# Patient Record
Sex: MALE | Race: WHITE | NOT HISPANIC OR LATINO | Employment: OTHER | URBAN - METROPOLITAN AREA
[De-identification: names, ages, dates, MRNs, and addresses within clinical notes are randomized per-mention and may not be internally consistent; named-entity substitution may affect disease eponyms.]

---

## 2023-05-26 ENCOUNTER — APPOINTMENT (OUTPATIENT)
Dept: RADIOLOGY | Facility: MEDICAL CENTER | Age: 83
DRG: 983 | End: 2023-05-26
Attending: EMERGENCY MEDICINE
Payer: MEDICARE

## 2023-05-26 ENCOUNTER — APPOINTMENT (OUTPATIENT)
Dept: RADIOLOGY | Facility: MEDICAL CENTER | Age: 83
DRG: 983 | End: 2023-05-26
Payer: MEDICARE

## 2023-05-26 ENCOUNTER — ANESTHESIA EVENT (OUTPATIENT)
Dept: SURGERY | Facility: MEDICAL CENTER | Age: 83
DRG: 983 | End: 2023-05-26
Payer: MEDICARE

## 2023-05-26 ENCOUNTER — HOSPITAL ENCOUNTER (INPATIENT)
Facility: MEDICAL CENTER | Age: 83
LOS: 1 days | DRG: 983 | End: 2023-05-27
Attending: EMERGENCY MEDICINE | Admitting: SURGERY
Payer: MEDICARE

## 2023-05-26 ENCOUNTER — ANESTHESIA (OUTPATIENT)
Dept: SURGERY | Facility: MEDICAL CENTER | Age: 83
DRG: 983 | End: 2023-05-26
Payer: MEDICARE

## 2023-05-26 DIAGNOSIS — S01.01XA LACERATION OF SCALP, INITIAL ENCOUNTER: ICD-10-CM

## 2023-05-26 DIAGNOSIS — T14.90XA TRAUMA: ICD-10-CM

## 2023-05-26 LAB
ABO + RH BLD: NORMAL
ABO GROUP BLD: NORMAL
ALBUMIN SERPL BCP-MCNC: 4 G/DL (ref 3.2–4.9)
ALBUMIN/GLOB SERPL: 1.5 G/DL
ALP SERPL-CCNC: 64 U/L (ref 30–99)
ALT SERPL-CCNC: 28 U/L (ref 2–50)
ANION GAP SERPL CALC-SCNC: 19 MMOL/L (ref 7–16)
APTT PPP: 25.5 SEC (ref 24.7–36)
AST SERPL-CCNC: 48 U/L (ref 12–45)
BILIRUB SERPL-MCNC: 0.5 MG/DL (ref 0.1–1.5)
BLD GP AB SCN SERPL QL: NORMAL
BUN SERPL-MCNC: 35 MG/DL (ref 8–22)
CALCIUM ALBUM COR SERPL-MCNC: 9.1 MG/DL (ref 8.5–10.5)
CALCIUM SERPL-MCNC: 9.1 MG/DL (ref 8.5–10.5)
CHLORIDE SERPL-SCNC: 101 MMOL/L (ref 96–112)
CO2 SERPL-SCNC: 20 MMOL/L (ref 20–33)
CREAT SERPL-MCNC: 1.18 MG/DL (ref 0.5–1.4)
EKG IMPRESSION: NORMAL
ERYTHROCYTE [DISTWIDTH] IN BLOOD BY AUTOMATED COUNT: 42.5 FL (ref 35.9–50)
ETHANOL BLD-MCNC: <10.1 MG/DL
GFR SERPLBLD CREATININE-BSD FMLA CKD-EPI: 61 ML/MIN/1.73 M 2
GLOBULIN SER CALC-MCNC: 2.6 G/DL (ref 1.9–3.5)
GLUCOSE SERPL-MCNC: 104 MG/DL (ref 65–99)
HCT VFR BLD AUTO: 40.8 % (ref 42–52)
HGB BLD-MCNC: 13.9 G/DL (ref 14–18)
INR PPP: 1.06 (ref 0.87–1.13)
MCH RBC QN AUTO: 31.2 PG (ref 27–33)
MCHC RBC AUTO-ENTMCNC: 34.1 G/DL (ref 32.3–36.5)
MCV RBC AUTO: 91.5 FL (ref 81.4–97.8)
PLATELET # BLD AUTO: 270 K/UL (ref 164–446)
PMV BLD AUTO: 9.5 FL (ref 9–12.9)
POTASSIUM SERPL-SCNC: 3.5 MMOL/L (ref 3.6–5.5)
PROT SERPL-MCNC: 6.6 G/DL (ref 6–8.2)
PROTHROMBIN TIME: 13.7 SEC (ref 12–14.6)
RBC # BLD AUTO: 4.46 M/UL (ref 4.7–6.1)
RH BLD: NORMAL
SODIUM SERPL-SCNC: 140 MMOL/L (ref 135–145)
WBC # BLD AUTO: 15.1 K/UL (ref 4.8–10.8)

## 2023-05-26 PROCEDURE — 700111 HCHG RX REV CODE 636 W/ 250 OVERRIDE (IP): Performed by: ANESTHESIOLOGY

## 2023-05-26 PROCEDURE — 160009 HCHG ANES TIME/MIN: Performed by: SURGERY

## 2023-05-26 PROCEDURE — 3E0234Z INTRODUCTION OF SERUM, TOXOID AND VACCINE INTO MUSCLE, PERCUTANEOUS APPROACH: ICD-10-PCS | Performed by: EMERGENCY MEDICINE

## 2023-05-26 PROCEDURE — A9270 NON-COVERED ITEM OR SERVICE: HCPCS | Performed by: SURGERY

## 2023-05-26 PROCEDURE — 99291 CRITICAL CARE FIRST HOUR: CPT | Mod: AI,25 | Performed by: SURGERY

## 2023-05-26 PROCEDURE — 93005 ELECTROCARDIOGRAM TRACING: CPT | Performed by: SURGERY

## 2023-05-26 PROCEDURE — 86900 BLOOD TYPING SEROLOGIC ABO: CPT

## 2023-05-26 PROCEDURE — 700101 HCHG RX REV CODE 250: Performed by: ANESTHESIOLOGY

## 2023-05-26 PROCEDURE — 90715 TDAP VACCINE 7 YRS/> IM: CPT | Performed by: SURGERY

## 2023-05-26 PROCEDURE — 700102 HCHG RX REV CODE 250 W/ 637 OVERRIDE(OP): Performed by: SURGERY

## 2023-05-26 PROCEDURE — 85027 COMPLETE CBC AUTOMATED: CPT

## 2023-05-26 PROCEDURE — 99100 ANES PT EXTEME AGE<1 YR&>70: CPT | Performed by: ANESTHESIOLOGY

## 2023-05-26 PROCEDURE — 71260 CT THORAX DX C+: CPT

## 2023-05-26 PROCEDURE — 72131 CT LUMBAR SPINE W/O DYE: CPT

## 2023-05-26 PROCEDURE — 160038 HCHG SURGERY MINUTES - EA ADDL 1 MIN LEVEL 2: Performed by: SURGERY

## 2023-05-26 PROCEDURE — 700105 HCHG RX REV CODE 258: Performed by: SURGERY

## 2023-05-26 PROCEDURE — 86901 BLOOD TYPING SEROLOGIC RH(D): CPT

## 2023-05-26 PROCEDURE — 82077 ASSAY SPEC XCP UR&BREATH IA: CPT

## 2023-05-26 PROCEDURE — 85610 PROTHROMBIN TIME: CPT

## 2023-05-26 PROCEDURE — 13122 CMPLX RPR S/A/L ADDL 5 CM/>: CPT | Performed by: SURGERY

## 2023-05-26 PROCEDURE — 90471 IMMUNIZATION ADMIN: CPT

## 2023-05-26 PROCEDURE — 86850 RBC ANTIBODY SCREEN: CPT

## 2023-05-26 PROCEDURE — 160035 HCHG PACU - 1ST 60 MINS PHASE I: Performed by: SURGERY

## 2023-05-26 PROCEDURE — 72170 X-RAY EXAM OF PELVIS: CPT

## 2023-05-26 PROCEDURE — 72125 CT NECK SPINE W/O DYE: CPT

## 2023-05-26 PROCEDURE — 700117 HCHG RX CONTRAST REV CODE 255: Performed by: EMERGENCY MEDICINE

## 2023-05-26 PROCEDURE — 160027 HCHG SURGERY MINUTES - 1ST 30 MINS LEVEL 2: Performed by: SURGERY

## 2023-05-26 PROCEDURE — 36415 COLL VENOUS BLD VENIPUNCTURE: CPT

## 2023-05-26 PROCEDURE — 80053 COMPREHEN METABOLIC PANEL: CPT

## 2023-05-26 PROCEDURE — 160036 HCHG PACU - EA ADDL 30 MINS PHASE I: Performed by: SURGERY

## 2023-05-26 PROCEDURE — 160002 HCHG RECOVERY MINUTES (STAT): Performed by: SURGERY

## 2023-05-26 PROCEDURE — 72128 CT CHEST SPINE W/O DYE: CPT

## 2023-05-26 PROCEDURE — 13121 CMPLX RPR S/A/L 2.6-7.5 CM: CPT | Performed by: SURGERY

## 2023-05-26 PROCEDURE — G0390 TRAUMA RESPONS W/HOSP CRITI: HCPCS

## 2023-05-26 PROCEDURE — 0NB10ZZ EXCISION OF FRONTAL BONE, OPEN APPROACH: ICD-10-PCS | Performed by: SURGERY

## 2023-05-26 PROCEDURE — 00300 ANES ALL PX INTEG H/N/PTRUNK: CPT | Performed by: ANESTHESIOLOGY

## 2023-05-26 PROCEDURE — 71045 X-RAY EXAM CHEST 1 VIEW: CPT

## 2023-05-26 PROCEDURE — 96365 THER/PROPH/DIAG IV INF INIT: CPT

## 2023-05-26 PROCEDURE — 700111 HCHG RX REV CODE 636 W/ 250 OVERRIDE (IP): Performed by: SURGERY

## 2023-05-26 PROCEDURE — 700105 HCHG RX REV CODE 258: Performed by: ANESTHESIOLOGY

## 2023-05-26 PROCEDURE — 99140 ANES COMP EMERGENCY COND: CPT | Performed by: ANESTHESIOLOGY

## 2023-05-26 PROCEDURE — 93010 ELECTROCARDIOGRAM REPORT: CPT | Performed by: INTERNAL MEDICINE

## 2023-05-26 PROCEDURE — 160048 HCHG OR STATISTICAL LEVEL 1-5: Performed by: SURGERY

## 2023-05-26 PROCEDURE — 70450 CT HEAD/BRAIN W/O DYE: CPT

## 2023-05-26 PROCEDURE — 770001 HCHG ROOM/CARE - MED/SURG/GYN PRIV*

## 2023-05-26 PROCEDURE — 85730 THROMBOPLASTIN TIME PARTIAL: CPT

## 2023-05-26 PROCEDURE — 99291 CRITICAL CARE FIRST HOUR: CPT

## 2023-05-26 RX ORDER — ONDANSETRON 2 MG/ML
4 INJECTION INTRAMUSCULAR; INTRAVENOUS
Status: DISCONTINUED | OUTPATIENT
Start: 2023-05-26 | End: 2023-05-26 | Stop reason: HOSPADM

## 2023-05-26 RX ORDER — OXYCODONE HCL 5 MG/5 ML
5 SOLUTION, ORAL ORAL
Status: DISCONTINUED | OUTPATIENT
Start: 2023-05-26 | End: 2023-05-26 | Stop reason: HOSPADM

## 2023-05-26 RX ORDER — SODIUM CHLORIDE, SODIUM LACTATE, POTASSIUM CHLORIDE, CALCIUM CHLORIDE 600; 310; 30; 20 MG/100ML; MG/100ML; MG/100ML; MG/100ML
INJECTION, SOLUTION INTRAVENOUS
Status: DISCONTINUED | OUTPATIENT
Start: 2023-05-26 | End: 2023-05-26 | Stop reason: SURG

## 2023-05-26 RX ORDER — HALOPERIDOL 5 MG/ML
1 INJECTION INTRAMUSCULAR
Status: DISCONTINUED | OUTPATIENT
Start: 2023-05-26 | End: 2023-05-26 | Stop reason: HOSPADM

## 2023-05-26 RX ORDER — ONDANSETRON 2 MG/ML
4 INJECTION INTRAMUSCULAR; INTRAVENOUS EVERY 4 HOURS PRN
Status: DISCONTINUED | OUTPATIENT
Start: 2023-05-26 | End: 2023-05-27 | Stop reason: HOSPADM

## 2023-05-26 RX ORDER — ENEMA 19; 7 G/133ML; G/133ML
1 ENEMA RECTAL
Status: DISCONTINUED | OUTPATIENT
Start: 2023-05-26 | End: 2023-05-27 | Stop reason: HOSPADM

## 2023-05-26 RX ORDER — BISACODYL 10 MG
10 SUPPOSITORY, RECTAL RECTAL
Status: DISCONTINUED | OUTPATIENT
Start: 2023-05-26 | End: 2023-05-27 | Stop reason: HOSPADM

## 2023-05-26 RX ORDER — HYDROMORPHONE HYDROCHLORIDE 1 MG/ML
0.4 INJECTION, SOLUTION INTRAMUSCULAR; INTRAVENOUS; SUBCUTANEOUS
Status: DISCONTINUED | OUTPATIENT
Start: 2023-05-26 | End: 2023-05-26 | Stop reason: HOSPADM

## 2023-05-26 RX ORDER — ONDANSETRON 4 MG/1
4 TABLET, ORALLY DISINTEGRATING ORAL EVERY 4 HOURS PRN
Status: DISCONTINUED | OUTPATIENT
Start: 2023-05-26 | End: 2023-05-27 | Stop reason: HOSPADM

## 2023-05-26 RX ORDER — BACITRACIN ZINC 500 [USP'U]/G
OINTMENT TOPICAL
Status: DISCONTINUED | OUTPATIENT
Start: 2023-05-26 | End: 2023-05-26 | Stop reason: HOSPADM

## 2023-05-26 RX ORDER — HYDRALAZINE HYDROCHLORIDE 20 MG/ML
5 INJECTION INTRAMUSCULAR; INTRAVENOUS
Status: DISCONTINUED | OUTPATIENT
Start: 2023-05-26 | End: 2023-05-26 | Stop reason: HOSPADM

## 2023-05-26 RX ORDER — DOCUSATE SODIUM 100 MG/1
100 CAPSULE, LIQUID FILLED ORAL 2 TIMES DAILY
Status: DISCONTINUED | OUTPATIENT
Start: 2023-05-26 | End: 2023-05-27 | Stop reason: HOSPADM

## 2023-05-26 RX ORDER — ACETAMINOPHEN 325 MG/1
650 TABLET ORAL EVERY 6 HOURS
Status: DISCONTINUED | OUTPATIENT
Start: 2023-05-26 | End: 2023-05-27 | Stop reason: HOSPADM

## 2023-05-26 RX ORDER — HYDROMORPHONE HYDROCHLORIDE 1 MG/ML
0.2 INJECTION, SOLUTION INTRAMUSCULAR; INTRAVENOUS; SUBCUTANEOUS
Status: DISCONTINUED | OUTPATIENT
Start: 2023-05-26 | End: 2023-05-26 | Stop reason: HOSPADM

## 2023-05-26 RX ORDER — ROCURONIUM BROMIDE 10 MG/ML
INJECTION, SOLUTION INTRAVENOUS PRN
Status: DISCONTINUED | OUTPATIENT
Start: 2023-05-26 | End: 2023-05-26 | Stop reason: SURG

## 2023-05-26 RX ORDER — OXYCODONE HCL 5 MG/5 ML
10 SOLUTION, ORAL ORAL
Status: DISCONTINUED | OUTPATIENT
Start: 2023-05-26 | End: 2023-05-26 | Stop reason: HOSPADM

## 2023-05-26 RX ORDER — SODIUM CHLORIDE, SODIUM LACTATE, POTASSIUM CHLORIDE, CALCIUM CHLORIDE 600; 310; 30; 20 MG/100ML; MG/100ML; MG/100ML; MG/100ML
INJECTION, SOLUTION INTRAVENOUS ONCE
Status: COMPLETED | OUTPATIENT
Start: 2023-05-26 | End: 2023-05-26

## 2023-05-26 RX ORDER — SUCCINYLCHOLINE CHLORIDE 20 MG/ML
INJECTION INTRAMUSCULAR; INTRAVENOUS PRN
Status: DISCONTINUED | OUTPATIENT
Start: 2023-05-26 | End: 2023-05-26 | Stop reason: SURG

## 2023-05-26 RX ORDER — CEFAZOLIN SODIUM 1 G/3ML
INJECTION, POWDER, FOR SOLUTION INTRAMUSCULAR; INTRAVENOUS PRN
Status: DISCONTINUED | OUTPATIENT
Start: 2023-05-26 | End: 2023-05-26 | Stop reason: SURG

## 2023-05-26 RX ORDER — SODIUM CHLORIDE, SODIUM LACTATE, POTASSIUM CHLORIDE, CALCIUM CHLORIDE 600; 310; 30; 20 MG/100ML; MG/100ML; MG/100ML; MG/100ML
INJECTION, SOLUTION INTRAVENOUS CONTINUOUS
Status: DISCONTINUED | OUTPATIENT
Start: 2023-05-26 | End: 2023-05-26 | Stop reason: HOSPADM

## 2023-05-26 RX ORDER — DIPHENHYDRAMINE HYDROCHLORIDE 50 MG/ML
12.5 INJECTION INTRAMUSCULAR; INTRAVENOUS
Status: DISCONTINUED | OUTPATIENT
Start: 2023-05-26 | End: 2023-05-26 | Stop reason: HOSPADM

## 2023-05-26 RX ORDER — AMOXICILLIN 250 MG
1 CAPSULE ORAL NIGHTLY
Status: DISCONTINUED | OUTPATIENT
Start: 2023-05-26 | End: 2023-05-27 | Stop reason: HOSPADM

## 2023-05-26 RX ORDER — LIDOCAINE HYDROCHLORIDE 20 MG/ML
INJECTION, SOLUTION EPIDURAL; INFILTRATION; INTRACAUDAL; PERINEURAL PRN
Status: DISCONTINUED | OUTPATIENT
Start: 2023-05-26 | End: 2023-05-26 | Stop reason: SURG

## 2023-05-26 RX ORDER — POLYETHYLENE GLYCOL 3350 17 G/17G
1 POWDER, FOR SOLUTION ORAL 2 TIMES DAILY
Status: DISCONTINUED | OUTPATIENT
Start: 2023-05-26 | End: 2023-05-27 | Stop reason: HOSPADM

## 2023-05-26 RX ORDER — OXYCODONE HYDROCHLORIDE 5 MG/1
5 TABLET ORAL
Status: DISCONTINUED | OUTPATIENT
Start: 2023-05-26 | End: 2023-05-27 | Stop reason: HOSPADM

## 2023-05-26 RX ORDER — HYDROMORPHONE HYDROCHLORIDE 1 MG/ML
0.1 INJECTION, SOLUTION INTRAMUSCULAR; INTRAVENOUS; SUBCUTANEOUS
Status: DISCONTINUED | OUTPATIENT
Start: 2023-05-26 | End: 2023-05-26 | Stop reason: HOSPADM

## 2023-05-26 RX ORDER — OXYCODONE HYDROCHLORIDE 5 MG/1
2.5 TABLET ORAL
Status: DISCONTINUED | OUTPATIENT
Start: 2023-05-26 | End: 2023-05-27 | Stop reason: HOSPADM

## 2023-05-26 RX ORDER — ACETAMINOPHEN 325 MG/1
650 TABLET ORAL EVERY 6 HOURS PRN
Status: DISCONTINUED | OUTPATIENT
Start: 2023-05-31 | End: 2023-05-27 | Stop reason: HOSPADM

## 2023-05-26 RX ORDER — METOPROLOL TARTRATE 1 MG/ML
1 INJECTION, SOLUTION INTRAVENOUS
Status: DISCONTINUED | OUTPATIENT
Start: 2023-05-26 | End: 2023-05-26 | Stop reason: HOSPADM

## 2023-05-26 RX ORDER — AMOXICILLIN 250 MG
1 CAPSULE ORAL
Status: DISCONTINUED | OUTPATIENT
Start: 2023-05-26 | End: 2023-05-27 | Stop reason: HOSPADM

## 2023-05-26 RX ORDER — HYDROMORPHONE HYDROCHLORIDE 1 MG/ML
0.25 INJECTION, SOLUTION INTRAMUSCULAR; INTRAVENOUS; SUBCUTANEOUS
Status: DISCONTINUED | OUTPATIENT
Start: 2023-05-26 | End: 2023-05-27 | Stop reason: HOSPADM

## 2023-05-26 RX ORDER — EPHEDRINE SULFATE 50 MG/ML
5 INJECTION, SOLUTION INTRAVENOUS
Status: DISCONTINUED | OUTPATIENT
Start: 2023-05-26 | End: 2023-05-26 | Stop reason: HOSPADM

## 2023-05-26 RX ADMIN — CLOSTRIDIUM TETANI TOXOID ANTIGEN (FORMALDEHYDE INACTIVATED), CORYNEBACTERIUM DIPHTHERIAE TOXOID ANTIGEN (FORMALDEHYDE INACTIVATED), BORDETELLA PERTUSSIS TOXOID ANTIGEN (GLUTARALDEHYDE INACTIVATED), BORDETELLA PERTUSSIS FILAMENTOUS HEMAGGLUTININ ANTIGEN (FORMALDEHYDE INACTIVATED), BORDETELLA PERTUSSIS PERTACTIN ANTIGEN, AND BORDETELLA PERTUSSIS FIMBRIAE 2/3 ANTIGEN 0.5 ML: 5; 2; 2.5; 5; 3; 5 INJECTION, SUSPENSION INTRAMUSCULAR at 10:08

## 2023-05-26 RX ADMIN — CEFAZOLIN 2 G: 1 INJECTION, POWDER, FOR SOLUTION INTRAMUSCULAR; INTRAVENOUS at 12:32

## 2023-05-26 RX ADMIN — LIDOCAINE HYDROCHLORIDE 50 MG: 20 INJECTION, SOLUTION EPIDURAL; INFILTRATION; INTRACAUDAL at 12:20

## 2023-05-26 RX ADMIN — SODIUM CHLORIDE, POTASSIUM CHLORIDE, SODIUM LACTATE AND CALCIUM CHLORIDE: 600; 310; 30; 20 INJECTION, SOLUTION INTRAVENOUS at 10:06

## 2023-05-26 RX ADMIN — LIDOCAINE HYDROCHLORIDE 50 MG: 20 INJECTION, SOLUTION EPIDURAL; INFILTRATION; INTRACAUDAL at 13:06

## 2023-05-26 RX ADMIN — FENTANYL CITRATE 50 MCG: 50 INJECTION, SOLUTION INTRAMUSCULAR; INTRAVENOUS at 12:53

## 2023-05-26 RX ADMIN — SUCCINYLCHOLINE CHLORIDE 80 MG: 20 INJECTION, SOLUTION INTRAMUSCULAR; INTRAVENOUS at 12:20

## 2023-05-26 RX ADMIN — PROPOFOL 10 MG: 10 INJECTION, EMULSION INTRAVENOUS at 12:47

## 2023-05-26 RX ADMIN — OXYCODONE 5 MG: 5 TABLET ORAL at 18:29

## 2023-05-26 RX ADMIN — ROCURONIUM BROMIDE 3 MG: 50 INJECTION, SOLUTION INTRAVENOUS at 12:20

## 2023-05-26 RX ADMIN — PROPOFOL 10 MG: 10 INJECTION, EMULSION INTRAVENOUS at 13:06

## 2023-05-26 RX ADMIN — SUGAMMADEX 100 MG: 100 INJECTION, SOLUTION INTRAVENOUS at 13:20

## 2023-05-26 RX ADMIN — ACETAMINOPHEN 650 MG: 325 TABLET, FILM COATED ORAL at 18:22

## 2023-05-26 RX ADMIN — FENTANYL CITRATE 25 MCG: 50 INJECTION, SOLUTION INTRAMUSCULAR; INTRAVENOUS at 14:33

## 2023-05-26 RX ADMIN — PIPERACILLIN AND TAZOBACTAM 3.38 G: 3; .375 INJECTION, POWDER, FOR SOLUTION INTRAVENOUS at 10:09

## 2023-05-26 RX ADMIN — IOHEXOL 100 ML: 350 INJECTION, SOLUTION INTRAVENOUS at 10:28

## 2023-05-26 RX ADMIN — PROPOFOL 90 MG: 10 INJECTION, EMULSION INTRAVENOUS at 12:20

## 2023-05-26 RX ADMIN — DOCUSATE SODIUM 100 MG: 100 CAPSULE, LIQUID FILLED ORAL at 18:23

## 2023-05-26 RX ADMIN — FENTANYL CITRATE 50 MCG: 50 INJECTION, SOLUTION INTRAMUSCULAR; INTRAVENOUS at 12:49

## 2023-05-26 RX ADMIN — SODIUM CHLORIDE, POTASSIUM CHLORIDE, SODIUM LACTATE AND CALCIUM CHLORIDE: 600; 310; 30; 20 INJECTION, SOLUTION INTRAVENOUS at 12:16

## 2023-05-26 ASSESSMENT — PAIN DESCRIPTION - PAIN TYPE: TYPE: ACUTE PAIN;SURGICAL PAIN

## 2023-05-26 NOTE — ASSESSMENT & PLAN NOTE
Arrived with avulsion to forehead.  CT with multifocal soft tissue lacerations and marked subcutaneous swelling in the left frontal region. Multiple small radiopaque foreign bodies in the swollen soft tissues.  Rocephin en route.  Zosyn in the trauma bay.  OR for irrigation, debridement, and repair of complex laceration measuring total of 18 cm.  Bacitracin and wound care.

## 2023-05-26 NOTE — OR NURSING
Spouse at bedside. Pt has been stable. Gave pain medications for restlessness.   Spouse reports that pt has early dementia.spouse requesting pt to be restrained else he will cause damage to surgical site. Spouse informs that pt is incontinent and wears depends at home.   Gave spouse pt's watch. She will return tomorrow.

## 2023-05-26 NOTE — ED NOTES
This tech asked by provider to clean patient as he was heavily covered in dirt from his waist and below. This tech cleaned patient with soap and warm water. New chux pad placed under patient and new, warm blankets provided over top of patient.   RN aware

## 2023-05-26 NOTE — ANESTHESIA PREPROCEDURE EVALUATION
Case: [id number] Date/Time: 05/26/23 1230    Procedure: REPAIR, SCALP LACERATIONS WITH WOUND WASHOUT    Location: [facility] OR 08 / SURGERY [facility]    Surgeons: Neeraj Cortez M.D.      82 yo with dementia  No history from old chart  Acute scalp injury    Relevant Problems   CARDIAC   (positive) Pacemaker       Physical Exam    Airway   Mallampati: II  TM distance: >3 FB  Neck ROM: full       Cardiovascular - normal exam  Rhythm: regular  Rate: normal  (-) murmur     Dental - normal exam    Unable to assess dental       Pulmonary - normal exam  Breath sounds clear to auscultation     Abdominal    Neurological - normal exam         Other findings: Pt won't open mouth on command.  He isn't speaking. Eyes are open, awake.  Blood on scalp, c-collar but CT doesn't indicate acute injury            Anesthesia Plan    ASA 3- EMERGENT   ASA physical status 3 criteria: implanted pacemakerASA physical status emergent criteria: acute hemorrhage    Plan - general               Induction: intravenous    Postoperative Plan: Postoperative administration of opioids is intended.    Pertinent diagnostic labs and testing reviewed    Informed Consent:    Anesthetic plan and risks discussed with patient.    Use of blood products discussed with: patient whom consented to blood products.

## 2023-05-26 NOTE — ASSESSMENT & PLAN NOTE
[place] since [place]  Trauma Red Activation.  Neeraj Cortez MD. Trauma Surgery.

## 2023-05-26 NOTE — ED NOTES
Bedside report from RADHA Yun  Pt to the room from CT  Pt is able to tell me his name, Carlos  Pt has c-collar in place and is on the monitor  Pt is unable to identify where he is from

## 2023-05-26 NOTE — ANESTHESIA TIME REPORT
Anesthesia Start and Stop Event Times     Date Time Event    5/26/2023 1212 Ready for Procedure     1216 Anesthesia Start     1333 Anesthesia Stop        Responsible Staff  05/26/23    Name Role Begin End    John Guan M.D. Anesth 1216 1333        Overtime Reason:  no overtime (within assigned shift)    Comments:

## 2023-05-26 NOTE — ANESTHESIA PROCEDURE NOTES
Airway    Date/Time: 5/26/2023 12:21 PM    Performed by: John Guan M.D.  Authorized by: John Guan M.D.    Location:  OR  Urgency:  Elective  Indications for Airway Management:  Anesthesia      Spontaneous Ventilation: absent    Sedation Level:  Deep  Preoxygenated: Yes    Patient Position:  Sniffing  Final Airway Type:  Endotracheal airway  Final Endotracheal Airway:  ETT  Cuffed: Yes    Technique Used for Successful ETT Placement:  Video laryngoscopy  Devices/Methods Used in Placement:  Cricoid pressure and intubating stylet    Insertion Site:  Oral  Blade Type:  Glide  Laryngoscope Blade/Videolaryngoscope Blade Size:  3  ETT Size (mm):  7.5  Measured from:  Teeth  ETT to Teeth (cm):  22  Placement Verified by: auscultation and capnometry    Cormack-Lehane Classification:  Grade I - full view of glottis  Number of Attempts at Approach:  1   Rsi with cricoid p

## 2023-05-26 NOTE — OP REPORT
Surgeon: Neeraj Cortez MD  Assist: None  Preoperative diagnosis: Complex scalp laceration with significant contamination  Postoperative diagnosis: 18 cm complex scalp laceration with significant contamination  Procedure:Irrigation, debridement, and repair of complex laceration measuring total of 18 cm  Anesthesia: General LMA anesthesia  Anesthesiologist: John Guan MD  Indications: 83-year-old man with severe dementia who was lost.  He was found with unwitnessed trauma but noted to have a complex scalp laceration with significant contamination.  Washout and repair in the OR is indicated.  Narrative: The patient was unable to give consent.  We proceeded with implied emergent consent.  He was placed under anesthesia by Dr. Guan.  He was draped and then the scalp and face were cleaned with wet lap pads.  He had significant mount of dried blood and some dirt.  This was carefully removed and irrigated off.  The rough edges of the laceration were then sharply debrided.  Hemostasis was assured with cautery.  The lacerations were then approximated with running 2-0 Ethilon sutures.  Lacerations were then coated with bacitracin ointment.  The patient tolerated procedure well without apparent complication final counts reported as correct.  Wound class was class IV dirty.

## 2023-05-26 NOTE — ED NOTES
Pt transported upstairs  At time of transport pt had equal and unlabored respirations   Pt transported with all belongings

## 2023-05-26 NOTE — ED PROVIDER NOTES
" ED PHYSICIAN NOTE    CHIEF COMPLAINT  Head injury, altered mental status      HPI/ROS  LIMITATION TO HISTORY   Select: Altered mental status / Confusion  OUTSIDE HISTORIAN(S):  EMS EMS report that the patient was found outside of a construction site with evidence of head injury.  Unknown mechanism of trauma.  Patient has a history of Alzheimer's dementia.    Nikki Gutierres is a 83 y.o. adult who presents with head injury.  He has altered mental status, but has history of Alzheimer's dementia.  Social work tells me that the patient is nonverbal at baseline.  He apparently escaped from where he was living has not been seen for a couple days.  Was found laying face down at a construction site.  Unknown injuries.    PAST MEDICAL HISTORY  Alzheimer's dementia    SOCIAL HISTORY       CURRENT MEDICATIONS  Home Medications    **Home medications have not yet been reviewed for this encounter**         ALLERGIES  Not on File    PHYSICAL EXAM  VITAL SIGNS: /55   Pulse 97   Temp (!) 35.8 °C (96.4 °F)   Resp 16   Ht 1.753 m (5' 9\")   Wt 68 kg (150 lb)   SpO2 97%   BMI 22.15 kg/m²    Constitutional: Awake and alert  HENT: Large highly contaminated laceration over the scalp.  Face without trauma  Eyes: Pulls are equal round and reactive.  Hemorrhagic chemosis of the left eye.  Right eye normal.  Neck: Hard collar present  Cardiovascular: Normal heart rate, Normal rhythm.  Symmetric peripheral pulses.   Thorax & Lungs: No respiratory distress, No wheezing, No rales, No rhonchi, No chest tenderness.   Abdomen: Bowel sounds normal, soft, non-distended, nontender, no mass  Skin: Laceration is noted no other laceration abrasion  Extremities: Deformity or pain with range of motion upper or lower extremity proximal distal  Neurologic: Nonverbal.  Will follow simple commands intermittently.  Will move all extremities      DIAGNOSTIC STUDIES / PROCEDURES  LABS/EKG  Results for orders placed or performed during the " hospital encounter of 05/26/23   Prothrombin Time   Result Value Ref Range    PT 13.7 12.0 - 14.6 sec    INR 1.06 0.87 - 1.13   APTT   Result Value Ref Range    APTT 25.5 24.7 - 36.0 sec   DIAGNOSTIC ALCOHOL   Result Value Ref Range    Diagnostic Alcohol <10.1 <10.1 mg/dL   Comp Metabolic Panel   Result Value Ref Range    Sodium 140 135 - 145 mmol/L    Potassium 3.5 (L) 3.6 - 5.5 mmol/L    Chloride 101 96 - 112 mmol/L    Co2 20 20 - 33 mmol/L    Anion Gap 19.0 (H) 7.0 - 16.0    Glucose 104 (H) 65 - 99 mg/dL    Bun 35 (H) 8 - 22 mg/dL    Creatinine 1.18 0.50 - 1.40 mg/dL    Calcium 9.1 8.5 - 10.5 mg/dL    AST(SGOT) 48 (H) 12 - 45 U/L    ALT(SGPT) 28 2 - 50 U/L    Alkaline Phosphatase 64 U/L    Total Bilirubin 0.5 0.1 - 1.5 mg/dL    Albumin 4.0 3.2 - 4.9 g/dL    Total Protein 6.6 6.0 - 8.2 g/dL    Globulin 2.6 1.9 - 3.5 g/dL    A-G Ratio 1.5 g/dL   CBC WITHOUT DIFFERENTIAL   Result Value Ref Range    WBC 15.1 (H) 4.8 - 10.8 K/uL    RBC 4.46 (L) 4.70 - 6.10 M/uL    Hemoglobin 13.9 (L) 14.0 - 18.0 g/dL    Hematocrit 40.8 (L) 42.0 - 52.0 %    MCV 91.5 81.4 - 97.8 fL    MCH 31.2 27.0 - 33.0 pg    MCHC 34.1 32.3 - 36.5 g/dL    RDW 42.5 35.9 - 50.0 fL    Platelet Count 270 164 - 446 K/uL    MPV 9.5 9.0 - 12.9 fL   COD - Adult (Type and Screen)   Result Value Ref Range    ABO Grouping Only O     Rh Grouping Only POS     Antibody Screen-Cod NEG    ESTIMATED GFR   Result Value Ref Range    GFR (CKD-EPI) 61 >60 mL/min/1.73 m 2   CORRECTED CALCIUM   Result Value Ref Range    Correct Calcium 9.1 8.5 - 10.5 mg/dL   Rhythm strip interpretation-regular rhythm      RADIOLOGY  I have independently interpreted the diagnostic imaging associated with this visit and am waiting the final reading from the radiologist.   My preliminary interpretation is as follows: Chest x-ray without pneumothorax  Radiologist interpretation:   CT-LSPINE W/O PLUS RECONS   Final Result      1.  No evidence of acute fracture and/or subluxation of lumbar spine.       2.  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      3.  Mild sigmoid scoliosis of the lumbar spine.      CT-TSPINE W/O PLUS RECONS   Final Result      1.  Moderate osteoarthritic changes of the thoracic spine.      2.  No evidence of fracture and/or subluxation.      CT-CSPINE WITHOUT PLUS RECONS   Final Result      1.  No evidence of acute cervical spine fracture or significant subluxation.      2.  Severe osteoarthritic changes at the C4-5 and C6-7 levels with moderate cervical spondylotic change.      3.  Previous interdiscal fusion at the C5-6 level.      CT-CHEST,ABDOMEN,PELVIS WITH   Final Result      No significant abnormality in thorax, abdomen and pelvis CT scan.      CT-HEAD W/O   Final Result      1.  Multifocal soft tissue lacerations and marked subcutaneous swelling in the left frontal region. Additionally, there are multiple small radiopaque foreign bodies in the swollen soft tissues.      2.  No evidence of intracranial injury pattern.      3.  Age-related cerebral atrophy.      4.  Mild-to-moderate periventricular white matter changes consistent with chronic microvascular ischemic gliosis.         DX-CHEST-LIMITED (1 VIEW)   Final Result      1.  Old granulomatous disease.      2.  Left sided pacemaker in good position with its distal leads overlying the right atrium and right ventricle.      DX-PELVIS-1 OR 2 VIEWS   Final Result      1.  No evidence of pelvic fractures.      US-ABORTED US PROCEDURE    (Results Pending)         COURSE & MEDICAL DECISION MAKING    INITIAL ASSESSMENT, COURSE AND PLAN  Care Narrative: Patient presents with complaints as above.  Complex presentation.  High risk case.  Patient has evidence of head injury.  Altered mental status.  He is reported to be nonverbal at his baseline however.  He requires complete trauma survey.  This was ordered.  Has a highly contaminated wound.  Ordered Zosyn  intravenously tetanus updated.  Laboratory data is ordered.    CT scans returned with evidence of head injury but no solid organ injury or other focal trauma.    Given the degree of scalp laceration and contamination Dr. Osorio plans to take patient to the operating room for washout.  He requested plastic surgery evaluation as he believes the patient may require a flap.  Paged plastic surgery.    Discussed case with Dr. Mcmahon.  He will try to make it to the operating room with Dr. Osorio otherwise the patient will be seen while hospitalized        ADDITIONAL PROBLEM LIST  Alzheimer's dementia    DISPOSITION AND DISCUSSIONS  I have discussed management of the patient with the following physicians and CHERYL's: Dr. Cortez, trauma surgery; Dr. Mcmahon, plastic surgery    Discussion of management with other [facility] or appropriate source(s): Pharmacy-antibiotics    FINAL IMPRESSION  1.  Head injury  2.  Altered mental status  3.  Complex scalp laceration    This dictation was created using voice recognition software. The accuracy of the dictation is limited to the abilities of the software. I expect there may be some errors of grammar and possibly content. The nursing notes were reviewed and certain aspects of this information were incorporated into this note.    Electronically signed by: Adam Mauricio M.D., 5/26/2023

## 2023-05-26 NOTE — ED TRIAGE NOTES
"Chief Complaint   Patient presents with    Trauma Red     [place] an obvious avulsion to his forehead. Pt has hx alzheimer's and is nonverbal @ [facility]     BIB [facility] for above complaint. See trauma narrator for details.     /55   Pulse 97   Temp (!) 35.8 °C (96.4 °F)   Resp 16   Ht 1.753 m (5' 9\")   Wt 68 kg (150 lb)   SpO2 97%   BMI 22.15 kg/m²     "

## 2023-05-26 NOTE — ED NOTES
Pt BIB [facility], hx alzheimers, missing since last night. Found altered, unconscious, nonverbal baseline. Severe injury/abrasions to forehead, head 2g rocephin given PTA.

## 2023-05-26 NOTE — ED NOTES
Unable to obtain med rec at this time  Patient was unable to participate in interview  No family at bedside or in demographics

## 2023-05-26 NOTE — OR NURSING
Transported to room. 2 assist to slide to bed. Pt tolerated well. Alexandrea GARCIA at bedside.Pt has been calm cooperative. Oriented to self. Non verbal but nods head yes/no appropriately. Informed pt that spouse took his watch home.

## 2023-05-26 NOTE — PROGRESS NOTES
I was paged at 0051 to consult on this patient. I arrived at the patient's bedside at 1001. No orthopedic surgery needs at this time. Please reach out for any new concerns if found on tertiary exams.

## 2023-05-26 NOTE — H&P
"     CHIEF COMPLAINT: Presumed trauma.     HISTORY OF PRESENT ILLNESS: The patient is a 83 year-old  elderly man who was lost for period of time.  He has severe Alzheimer's and eventually was found.  There was no witnessed trauma but he was found to have severe scalp lacerations with significant contamination.  He was triaged here as a trauma red.  On arrival he was noted to be alert with clinically intact airway protection.  He had bilateral breath sounds and was hemodynamically stable.  He intermittently followed commands and did not have any focal deficits but does not talk.  Clinically he appears to be at his baseline neurologic functioning with obvious severe dementia.    TRIAGE CATEGORY: The patient was triaged as a Trauma Red Activation. An expeditious primary and secondary survey with required adjuncts was conducted. See Trauma Narrator for full details.    PAST MEDICAL HISTORY:  has no past medical history on file.    PAST SURGICAL HISTORY:  has no past surgical history on file.    ALLERGIES: Not on File    CURRENT MEDICATIONS:   Home Medications    **Home medications have not yet been reviewed for this encounter**       FAMILY HISTORY: family history is not on file.    SOCIAL HISTORY:      REVIEW OF SYSTEMS: Comprehensive review of systems is not able to be elicited from the patient secondary to the acuity of the clinical situation.    PHYSICAL EXAMINATION:      Vital Signs: /55   Pulse 97   Temp (!) 35.8 °C (96.4 °F)   Resp 16   Ht 1.753 m (5' 9\")   Wt 68 kg (150 lb)   SpO2 97%   Physical Exam  General: Laying in bed and in no distress  HEENT: Pupils equally round reactive to light, extraocular muscles intact, oropharynx without lesions, fairly extensive scalp lacerations with contamination with dirt and blood  Neck: C-collar in place  Chest: Bilateral breath sounds with symmetrical chest excursion  Cardiovascular: Regular rate and rhythm  Abdomen: Soft, nontender, nondistended  Pelvis: Stable " and nontender  Back: Stable without step-offs  Extremities: Abrasions but no open wounds or deformities  Neurologic: Baseline dementia, no focal deficits, nonverbal with GCS 10-11  Vascular: Palpable radial and femoral pulses  Skin: Warm and dry  Psychiatric: Unable to assess  LABORATORY VALUES:   Recent Labs     05/26/23  1002   WBC 15.1*   RBC 4.46*   HEMOGLOBIN 13.9*   HEMATOCRIT 40.8*   MCV 91.5   MCH 31.2   MCHC 34.1   RDW 42.5   PLATELETCT 270   MPV 9.5     Recent Labs     05/26/23  1002   SODIUM 140   POTASSIUM 3.5*   CHLORIDE 101   CO2 20   GLUCOSE 104*   BUN 35*   CREATININE 1.18   CALCIUM 9.1     Recent Labs     05/26/23  1002   ASTSGOT 48*   ALTSGPT 28   TBILIRUBIN 0.5   ALKPHOSPHAT 64   GLOBULIN 2.6   INR 1.06     Recent Labs     05/26/23  1002   APTT 25.5   INR 1.06        IMAGING:   CT-LSPINE W/O PLUS RECONS   Final Result      1.  No evidence of acute fracture and/or subluxation of lumbar spine.      2.  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      3.  Mild sigmoid scoliosis of the lumbar spine.      CT-TSPINE W/O PLUS RECONS   Final Result      1.  Moderate osteoarthritic changes of the thoracic spine.      2.  No evidence of fracture and/or subluxation.      CT-CSPINE WITHOUT PLUS RECONS   Final Result      1.  No evidence of acute cervical spine fracture or significant subluxation.      2.  Severe osteoarthritic changes at the C4-5 and C6-7 levels with moderate cervical spondylotic change.      3.  Previous interdiscal fusion at the C5-6 level.      CT-CHEST,ABDOMEN,PELVIS WITH   Final Result      No significant abnormality in thorax, abdomen and pelvis CT scan.      CT-HEAD W/O   Final Result      1.  Multifocal soft tissue lacerations and marked subcutaneous swelling in the left frontal region. Additionally, there are multiple small radiopaque foreign bodies in the swollen soft tissues.      2.  No evidence of  intracranial injury pattern.      3.  Age-related cerebral atrophy.      4.  Mild-to-moderate periventricular white matter changes consistent with chronic microvascular ischemic gliosis.         DX-CHEST-LIMITED (1 VIEW)   Final Result      1.  Old granulomatous disease.      2.  Left sided pacemaker in good position with its distal leads overlying the right atrium and right ventricle.      DX-PELVIS-1 OR 2 VIEWS   Final Result      1.  No evidence of pelvic fractures.      US-ABORTED US PROCEDURE    (Results Pending)   US-TRAUMA VEIN SCREEN LOWER BILAT EXTREMITY    (Results Pending)       ASSESSMENT AND PLAN:     Trauma  Missing since last night. [place] the [place].  Trauma Red Activation.  Neeraj Cortez MD. Trauma Surgery.    Hypothermia associated with environmental change  Found down in the desert. 35.8 Celsius temperature.  Warming measures applied.  Monitor.    Scalp avulsion, initial encounter  Arrived with avulsion to forehead.  CT with multifocal soft tissue lacerations and marked subcutaneous swelling in the left frontal region. Multiple small radiopaque foreign bodies in the swollen soft tissues.  Rocephin en route.  Zosyn in the trauma bay.  OR for washout and repair.    Alzheimer's dementia (HCC)  History of.   Waiting for wife to arrive with medication list.  83-year-old male with severe baseline dementia and now status post an episode of presumed trauma.  He was lost and found with evidence of trauma including extensive scalp lacerations with gross contamination.  Imaging otherwise is without any evidence of injuries.  He will be taken to the operating room for washout and repair of these complex lacerations.  He may eventually require some type of flap procedure although this is not certain at this time.    DISPOSITION: Surgery.  Trauma tertiary survey.    CRITICAL CARE TIME: 35 minutes excluding procedures.       ____________________________________     Neeraj Cortez M.D.    DD: 5/26/2023   11:35 AM

## 2023-05-26 NOTE — ASSESSMENT & PLAN NOTE
Found down in the desert. 35.8 Celsius temperature.  Warming measures applied.  5/27 [employer]  Monitor.

## 2023-05-27 ENCOUNTER — APPOINTMENT (OUTPATIENT)
Dept: RADIOLOGY | Facility: MEDICAL CENTER | Age: 83
DRG: 983 | End: 2023-05-27
Attending: SURGERY
Payer: MEDICARE

## 2023-05-27 VITALS
WEIGHT: 150 LBS | HEIGHT: 69 IN | SYSTOLIC BLOOD PRESSURE: 106 MMHG | RESPIRATION RATE: 18 BRPM | HEART RATE: 81 BPM | BODY MASS INDEX: 22.22 KG/M2 | TEMPERATURE: 99.3 F | DIASTOLIC BLOOD PRESSURE: 56 MMHG | OXYGEN SATURATION: 97 %

## 2023-05-27 LAB
ANION GAP SERPL CALC-SCNC: 23 MMOL/L (ref 7–16)
BASOPHILS # BLD AUTO: 0.1 % (ref 0–1.8)
BASOPHILS # BLD: 0.01 K/UL (ref 0–0.12)
BUN SERPL-MCNC: 30 MG/DL (ref 8–22)
CALCIUM SERPL-MCNC: 8.8 MG/DL (ref 8.5–10.5)
CHLORIDE SERPL-SCNC: 103 MMOL/L (ref 96–112)
CO2 SERPL-SCNC: 15 MMOL/L (ref 20–33)
CREAT SERPL-MCNC: 1.34 MG/DL (ref 0.5–1.4)
EOSINOPHIL # BLD AUTO: 0 K/UL (ref 0–0.51)
EOSINOPHIL NFR BLD: 0 % (ref 0–6.9)
ERYTHROCYTE [DISTWIDTH] IN BLOOD BY AUTOMATED COUNT: 43.5 FL (ref 35.9–50)
GFR SERPLBLD CREATININE-BSD FMLA CKD-EPI: 53 ML/MIN/1.73 M 2
GLUCOSE SERPL-MCNC: 78 MG/DL (ref 65–99)
HCT VFR BLD AUTO: 39.3 % (ref 42–52)
HGB BLD-MCNC: 13.6 G/DL (ref 14–18)
IMM GRANULOCYTES # BLD AUTO: 0.06 K/UL (ref 0–0.11)
IMM GRANULOCYTES NFR BLD AUTO: 0.5 % (ref 0–0.9)
LYMPHOCYTES # BLD AUTO: 0.78 K/UL (ref 1–4.8)
LYMPHOCYTES NFR BLD: 6.4 % (ref 22–41)
MCH RBC QN AUTO: 31.1 PG (ref 27–33)
MCHC RBC AUTO-ENTMCNC: 34.6 G/DL (ref 32.3–36.5)
MCV RBC AUTO: 89.7 FL (ref 81.4–97.8)
MONOCYTES # BLD AUTO: 1.68 K/UL (ref 0–0.85)
MONOCYTES NFR BLD AUTO: 13.7 % (ref 0–13.4)
NEUTROPHILS # BLD AUTO: 9.73 K/UL (ref 1.82–7.42)
NEUTROPHILS NFR BLD: 79.3 % (ref 44–72)
NRBC # BLD AUTO: 0 K/UL
NRBC BLD-RTO: 0 /100 WBC (ref 0–0.2)
PLATELET # BLD AUTO: 223 K/UL (ref 164–446)
PMV BLD AUTO: 9.7 FL (ref 9–12.9)
POTASSIUM SERPL-SCNC: 3.8 MMOL/L (ref 3.6–5.5)
RBC # BLD AUTO: 4.38 M/UL (ref 4.7–6.1)
SODIUM SERPL-SCNC: 141 MMOL/L (ref 135–145)
WBC # BLD AUTO: 12.3 K/UL (ref 4.8–10.8)

## 2023-05-27 PROCEDURE — 80048 BASIC METABOLIC PNL TOTAL CA: CPT

## 2023-05-27 PROCEDURE — 99239 HOSP IP/OBS DSCHRG MGMT >30: CPT

## 2023-05-27 PROCEDURE — A9270 NON-COVERED ITEM OR SERVICE: HCPCS | Performed by: SURGERY

## 2023-05-27 PROCEDURE — 97530 THERAPEUTIC ACTIVITIES: CPT

## 2023-05-27 PROCEDURE — 93970 EXTREMITY STUDY: CPT | Mod: 26 | Performed by: INTERNAL MEDICINE

## 2023-05-27 PROCEDURE — 700102 HCHG RX REV CODE 250 W/ 637 OVERRIDE(OP): Performed by: SURGERY

## 2023-05-27 PROCEDURE — 93970 EXTREMITY STUDY: CPT

## 2023-05-27 PROCEDURE — 97163 PT EVAL HIGH COMPLEX 45 MIN: CPT

## 2023-05-27 PROCEDURE — 92610 EVALUATE SWALLOWING FUNCTION: CPT

## 2023-05-27 PROCEDURE — 85025 COMPLETE CBC W/AUTO DIFF WBC: CPT

## 2023-05-27 RX ORDER — MEMANTINE HYDROCHLORIDE 10 MG/1
10 TABLET ORAL 2 TIMES DAILY
Status: DISCONTINUED | OUTPATIENT
Start: 2023-05-27 | End: 2023-05-27 | Stop reason: HOSPADM

## 2023-05-27 RX ORDER — MEMANTINE HYDROCHLORIDE 28 MG/1
28 CAPSULE, EXTENDED RELEASE ORAL DAILY
Status: DISCONTINUED | OUTPATIENT
Start: 2023-05-27 | End: 2023-05-27

## 2023-05-27 RX ORDER — DONEPEZIL HYDROCHLORIDE 5 MG/1
10 TABLET, FILM COATED ORAL 2 TIMES DAILY
Status: DISCONTINUED | OUTPATIENT
Start: 2023-05-27 | End: 2023-05-27 | Stop reason: HOSPADM

## 2023-05-27 RX ORDER — DONEPEZIL HYDROCHLORIDE 10 MG/1
10 TABLET, FILM COATED ORAL 2 TIMES DAILY
COMMUNITY

## 2023-05-27 RX ORDER — MEMANTINE HYDROCHLORIDE 28 MG/1
28 CAPSULE, EXTENDED RELEASE ORAL DAILY
COMMUNITY

## 2023-05-27 RX ORDER — ENOXAPARIN SODIUM 100 MG/ML
30 INJECTION SUBCUTANEOUS EVERY 12 HOURS
Status: DISCONTINUED | OUTPATIENT
Start: 2023-05-27 | End: 2023-05-27 | Stop reason: HOSPADM

## 2023-05-27 RX ORDER — ACETAMINOPHEN 325 MG/1
650 TABLET ORAL EVERY 6 HOURS
Qty: 30 TABLET | Refills: 0 | COMMUNITY
Start: 2023-05-27

## 2023-05-27 RX ADMIN — ACETAMINOPHEN 650 MG: 325 TABLET, FILM COATED ORAL at 04:13

## 2023-05-27 RX ADMIN — DOCUSATE SODIUM 100 MG: 100 CAPSULE, LIQUID FILLED ORAL at 04:13

## 2023-05-27 RX ADMIN — ACETAMINOPHEN 650 MG: 325 TABLET, FILM COATED ORAL at 12:16

## 2023-05-27 ASSESSMENT — ENCOUNTER SYMPTOMS
VOMITING: 0
BLURRED VISION: 0
NAUSEA: 0
MEMORY LOSS: 1
DOUBLE VISION: 0
EYE PAIN: 0
FEVER: 0
CHILLS: 0
MYALGIAS: 0
ABDOMINAL PAIN: 0

## 2023-05-27 ASSESSMENT — COGNITIVE AND FUNCTIONAL STATUS - GENERAL
MOVING TO AND FROM BED TO CHAIR: UNABLE
CLIMB 3 TO 5 STEPS WITH RAILING: A LOT
TURNING FROM BACK TO SIDE WHILE IN FLAT BAD: UNABLE
CLIMB 3 TO 5 STEPS WITH RAILING: A LITTLE
WALKING IN HOSPITAL ROOM: A LITTLE
MOBILITY SCORE: 12
MOBILITY SCORE: 11
STANDING UP FROM CHAIR USING ARMS: A LITTLE
MOVING FROM LYING ON BACK TO SITTING ON SIDE OF FLAT BED: UNABLE
WALKING IN HOSPITAL ROOM: A LITTLE
MOVING FROM LYING ON BACK TO SITTING ON SIDE OF FLAT BED: UNABLE
TURNING FROM BACK TO SIDE WHILE IN FLAT BAD: UNABLE
SUGGESTED CMS G CODE MODIFIER MOBILITY: CL
SUGGESTED CMS G CODE MODIFIER MOBILITY: CL
MOVING TO AND FROM BED TO CHAIR: UNABLE
STANDING UP FROM CHAIR USING ARMS: A LITTLE

## 2023-05-27 ASSESSMENT — GAIT ASSESSMENTS
ASSISTIVE DEVICE: HAND HELD ASSIST
ASSISTIVE DEVICE: FRONT WHEEL WALKER
GAIT LEVEL OF ASSIST: MINIMAL ASSIST
DISTANCE (FEET): 100
GAIT LEVEL OF ASSIST: CONTACT GUARD ASSIST
DISTANCE (FEET): 10
DEVIATION: SHUFFLED GAIT;BRADYKINETIC
DEVIATION: BRADYKINETIC;DECREASED BASE OF SUPPORT

## 2023-05-27 ASSESSMENT — PAIN SCALES - PAIN ASSESSMENT IN ADVANCED DEMENTIA (PAINAD)
BREATHING: NORMAL
TOTALSCORE: 0
BODYLANGUAGE: RELAXED
CONSOLABILITY: NO NEED TO CONSOLE
FACIALEXPRESSION: SMILING OR INEXPRESSIVE

## 2023-05-27 ASSESSMENT — PAIN DESCRIPTION - PAIN TYPE: TYPE: SURGICAL PAIN

## 2023-05-27 NOTE — PROGRESS NOTES
Discharge instructions given to patient; patient verbalizes understanding, all questions answered.  Copy of DC summary provided, signed copy in chart.  0 prescriptions electronically sent to pt's pharmacy/provided to patient, copies in chart.  Pt states personal belongings are in possession.  Pt escorted off unit by this RN without incident.  Patient IV was discontinued prior to arrival.

## 2023-05-27 NOTE — FACE TO FACE
Face to Face Note  -  Durable Medical Equipment    Radha Roberson D.N.P. - NPI: [id number]  I certify that this patient is under my care and that they have had a durable medical equipment(DME)face to face encounter by myself that meets the physician DME face-to-face encounter requirements with this patient on:    Date of encounter:   Patient:                    MRN:                       YOB: 2023  Carlos Murry  [id number]  1940     The encounter with the patient was in whole, or in part, for the following medical condition, which is the primary reason for durable medical equipment:  Other - Difficulty ambulation    I certify that, based on my findings, the following durable medical equipment is medically necessary:  Walkers. Front wheel walker        ------------------------------------------------------------------------------------------------------------------    Face to Face Supporting Documentation - Home Health    The encounter with this patient was in whole or in part the primary reason for home health admission.    Date of encounter:   Patient:                    MRN:                       YOB: 2023  Carlos Murry  [id number]  1940     Home health to see patient for:  Skilled Nursing care for assessment, interventions & education and Physical Therapy evaluation and treatment    Skilled need for:  Comment: Management of physical therapy    Skilled nursing interventions to include:  Comment: Management of Physical therapy    Homebound evidenced status by:  Needs the assistance of another person in order to leave the home. Leaving home must require a considerable and taxing effort. There must exist a normal inability to leave the home.    Community Physician to provide follow up care: No primary care provider on file.     Optional Interventions    Wound information & treatment:    Home Infusion Therapy orders:    Line/Drain/Airway:    I certify  the face to face encounter for this home care referral meets the CMS requirements and the encounter/clinical assessment with the patient was, in whole, or in part, for the medical condition(s) listed above, which is the primary reason for home health care. Based on my clinical findings: the service(s) are medically necessary, support the need for home health care, and the homebound criteria are met.  I certify that this patient has had a face to face encounter by myself.  SIS Mast.P. - NPI: [id number]    *Debility, frailty and advanced age in the absence of an acute deterioration or exacerbation of a condition do not qualify a patient for home health.

## 2023-05-27 NOTE — PROGRESS NOTES
Patient admitted to room T407 via PACU RN in [facility] at 1600. Patient's pain assessed using the non-verbal pain scale. Pt grimacing, pt medicated per MAR. Admission VSS. Pt oriented to room and use of call light. Call light within reach, bed locked and in lowest position. No further needs at this time.

## 2023-05-27 NOTE — THERAPY
Speech Language Pathology   Clinical Swallow Evaluation     Patient Name: Nikki Seventy-Three  AGE:  83 y.o., SEX:  adult  Medical Record #: [id number]  Date of Service: 2023      History of Present Illness  Patient is a 83 y.o. male who was lost for period of time.  He has severe Alzheimer's. Pt was found to have severe scalp lacerations with significant contamination.     PMHx Alzheimer's dementia    CMHx Hypothermia, scalp avulsion    CXR 23  Lungs:  There are several small calcified lesions in the left lower lobe and bilateral pulmonary hilum.  Pleura:  There is no pleural effusion or pneumothorax.    CT-Head w/o 23  1.  Multifocal soft tissue lacerations and marked subcutaneous swelling in the left frontal region. Additionally, there are multiple small radiopaque foreign bodies in the swollen soft tissues.  2.  No evidence of intracranial injury pattern.  3.  Age-related cerebral atrophy.  4.  Mild-to-moderate periventricular white matter changes consistent with chronic microvascular ischemic gliosis.         General Information:  Vitals  O2 Delivery Device: None - Room Air  Level of Consciousness: Alert, Awake  Patient Behaviors: Fatigue, Confused  Orientation: Self,   Follows Directives: Yes      Prior Living Situation & Level of Function:  Prior Services: Unable To Determine At This Time  Lives with - Patient's Self Care Capacity: Unable To Determine At This Time     Communication: WFL  Swallowing: Patient denies any hx of dysphagia. Pt is unable to provide detailed information re:PLOF.       Oral Mechanism Evaluation:  Dentition: Fair, Multiple carries   Facial Symmetry:  (edematous, L>R)  Facial Sensation: Equal     Labial Observations: WFL   Lingual Observations: Midline  Motor Speech: tremulous          Laryngeal Function:  Secretion Management: Adequate  Voice Quality:  (Presbyphonia, tremulous)  Cough: Perceptually WNL       Subjective  Patient cleared by RN for evaluation. Pt received  awake, fully participated with SLP tasks.      Assessment  Current Method of Nutrition: NPO until cleared by speech pathology  Positioning: Jaramillo's (60-90 degrees)  Bolus Administration: SLP, Patient    O2 Delivery Device: None - Room Air  Factor(s) Affecting Performance: Impaired mental status  Tracheostomy : No          Swallowing Trials:  Swallowing Trials  Ice: WFL  Thin Liquid (TN0): WFL  Liquidised (LQ3): WFL  Soft & Bite Sized (SB6): WFL  Regular (RG7): WFL      Comments: Patient needing feeding A during evaluation. Adequate oral bolus acceptance, containment and transit. Pharyngeal swallow response appeared timely. Adequate oral bolus acceptance/containment and transit. Prolonged but functional mastication with trials of soft/bite sized and regular solids. Immediate cough response with trial of thin liquids via cup x1, suspect d/t impulsivity, no other coughing appreciated. Vocal quality remained clear. No signs of esophageal dysfunction. No overt s/sx of aspiration appreciated.       Clinical Impressions  No clinical signs of oropharyngeal dysphagia noted during clinical swallow evaluation. Given AMS, short-term diet modification is indicated. Patient would benefit from 1:1 feeding A given impulsivity. SLP will continue to follow to ensure diet tolerance and monitor for changes. Will consider diagnostic swallow study with any ongoing concerns for aspiration.      Recommendations  Diet Consistency: Soft/bite sized solids and thin liquids  Instrumentation: None indicated at this time  Medication: As tolerated  Supervision: 1:1 feeding with constant supervision, Monitor due to impulsive behavior, Encourage self-feeding  Positioning: Fully upright and midline during oral intake  Risk Management : Small bites/sips, Slow rate of intake  Oral Care: BID         SLP Treatment Plan  Treatment Plan: Dysphagia Treatment, Patient/Family/Caregiver Training  SLP Frequency: 3x Per Week  Estimated Duration: Until Therapy  "Goals Met      Anticipated Discharge Needs  Discharge Recommendations: Anticipate that the patient will have no further speech therapy needs after discharge from the hospital   Therapy Recommendations Upon DC: Dysphagia Training, Patient / Family / Caregiver Education        Patient / Family Goals  Patient / Family Goal #1: \"more water\"  Short Term Goals  Short Term Goal # 1: Patient will consume a diet of soft/bite sized solids and thin liquids with no overt s/sx of aspiration given 1:1 feeding A      Michellesameer Zuniga MS,CCC-SLP    "

## 2023-05-27 NOTE — PROGRESS NOTES
Report received from Elisa PACU RN, assumed care at 0700.  Unable to assess pt orientation. Pt responds to name.  Pt declines any SOB, chest pain, new onset of numbness/ tingling  Pt grimacing, medicated per MAR.  Pt incontinent, condom catheter in place.  Pt has + bowel sounds, last BM PTA.  Pt is a x2 max assist  Pt is currently on an NPO diet sips w/ medications.  Pt resting in bed. 3 bed rails up, bed alarm on.  Plan of care discussed, all questions answered. Explained importance of calling before getting OOB and pt verbalizes understanding. Explained importance of oral care. Call light is within reach, treaded slipper socks on, bed in lowest/ locked position, hourly rounding in place, all needs met at this time

## 2023-05-27 NOTE — DISCHARGE PLANNING
Trauma Response    Referral: Trauma Red Response    Intervention: SW responded to trauma Red.  Pt was BIB [facility] after being found wandering at a construction site.  Pt was alert but not verbal upon arrival.  Pts name is Carlos Murry (: 1940).  SW obtained the following pt information: Per [facility], pt was reported missing to [facility]'s office.     MSW called and spoke to pt's wife Summer ([phone]). Summer stated she is aware pt is on the way to [facility] and she is on her way from [place]. MSW updated ERP and provided contact information for pt's wife.     Plan: remain available for support

## 2023-05-27 NOTE — THERAPY
Physical Therapy   Initial Evaluation     Patient Name: Nikki Seventy-Three  Age:  83 y.o., Sex:  adult  Medical Record #: [id number]  Today's Date: 5/27/2023     Precautions  Precautions: Fall Risk  Comments: hx of dementia    Assessment  Pt presents with impaired cognition, balance and activity tolerance [place], sustaining a 18 cm forehead laceration requiring I and D/repair, hospital course complicated by unclear baseline function/home environment; placed call to wife, no answer; pt following all one step commands and was pleasant throughout; mobilizing at min A and able to walk a short distance with FWW and min A, limited by HR to 138. Pt disoriented and unable to provide baseline info. Will follow, dc recommendations will depend on clarification of prior living situation;     Plan    Physical Therapy Initial Treatment Plan   Treatment Plan : Bed Mobility, Equipment, Manual Therapy, Gait Training, Neuro Re-Education / Balance, Self Care / Home Evaluation, Stair Training, Therapeutic Activities, Therapeutic Exercise  Treatment Frequency: 3 Times per Week  Duration: Until Therapy Goals Met    DC Equipment Recommendations: Unable to determine at this time  Discharge Recommendations: Other -unclear prior home environment at this time; pt currently requiring min A with FWW for functional mobility and would need this in next environment, not necessarily a further PT candidate given his cognition       Abridged Subjective/Objective     05/27/23 1045   Prior Living Situation   Prior Services Unable To Determine At This Time   Comments unclear, phone call placed to wife with no answer; no address to look up; discussed with trauma as well, they were not alerted when wife was at bedside and have no further info on whether pt lives at home or a facility; he does have a spouse that is currently unable to be reached   Prior Level of Functional Mobility   Bed Mobility Independent    Transfer Status Independent   Ambulation Independent   Ambulation Distance to tolerance   Assistive Devices Used None   Cognition    Cognition / Consciousness X   Level of Consciousness Alert   Ability To Follow Commands 1 Step   Safety Awareness Impaired   New Learning Impaired   Attention Impaired   Initiation Impaired   Comments pleasant and cooperative; thinks he is in [place] in the 40s; unable to answer social questions; does follow all one step commands for testing aside from being able to unclip the lap belt   Passive ROM Lower Body   Passive ROM Lower Body WDL   Strength Lower Body   Lower Body Strength  WDL   Comments grossly 4+/5 no focal deficits   Sensation Lower Body   Lower Extremity Sensation   Not Tested   Vision   Vision Comments pt able to identify therpaist with right eye covered; left eye with signficant abrasion and discharge   Balance Assessment   Sitting Balance (Static) Fair +   Sitting Balance (Dynamic) Fair +   Standing Balance (Static) Fair -   Standing Balance (Dynamic) Poor +   Weight Shift Sitting Good   Weight Shift Standing Fair   Comments B UE Support in sititng/standing; kyphotic with impulsive sit   Bed Mobility    Supine to Sit Minimal Assist   Sit to Supine   (NT, sitting in chair post)   Gait Analysis   Gait Level Of Assist Minimal Assist   Assistive Device Front Wheel Walker   Distance (Feet) 10   # of Times Distance was Traveled 1   Deviation Bradykinetic;Decreased Base Of Support   Weight Bearing Status full   Vision Deficits Impacting Mobility probable left eye issues   Comments distance limited by tachycardia to 138;   Functional Mobility   Sit to Stand Minimal Assist  (with FWW)   Bed, Chair, Wheelchair Transfer Minimal Assist  (with FWW)   Edema / Skin Assessment   Edema / Skin  X   Comments diffuse skin breakdown throughout forehead and back   Patient / Family Goals    Patient / Family Goal #1 none stated   Short Term Goals    Short Term Goal # 1 Pt will perform  supine<>sit from flat HOB/no railing wiht supervision within 6 visits to progress to independence (as needed).   Short Term Goal # 2 Pt will perform sit<>stand iwth FWW and supervision within 6 visits to progress to independence (as needed).   Short Term Goal # 3 Pt will ambulate x 150ft with FWW and supervision within 6 visits to ensure independent mobility at home (as needed).   Education Group   Role of Physical Therapist Patient Response Patient;Acceptance;Explanation;Demonstration;Verbal Demonstration;Action Demonstration   Use of Assistive Device Patient Response Patient;Acceptance;Explanation;Demonstration;Verbal Demonstration;Action Demonstration;Reinforcement Needed

## 2023-05-27 NOTE — ANESTHESIA POSTPROCEDURE EVALUATION
Patient: Nikki Seventy-Three    Procedure Summary     Date: 05/26/23 Room / Location: [facility] OR 08 / SURGERY [facility]    Anesthesia Start: 1216 Anesthesia Stop: 1333    Procedures:       REPAIR, SCALP LACERATIONS (Head)      WASH-OUT AND DEBRIDEMENT OF WOUND (Head) Diagnosis: (Scalp Avulsion)    Surgeons: Neeraj Cortez M.D. Responsible Provider: John Guan M.D.    Anesthesia Type: general ASA Status: 3 - Emergent          Final Anesthesia Type: general  Last vitals  BP   Blood Pressure : (!) 183/97    Temp   37.2 °C (99 °F)    Pulse   87   Resp   18    SpO2   94 %      Anesthesia Post Evaluation    Patient location during evaluation: PACU  Patient participation: complete - patient cannot participate  Level of consciousness: awake and alert    Airway patency: patent  Anesthetic complications: no  Cardiovascular status: hemodynamically stable and hypertensive  Respiratory status: acceptable  Hydration status: euvolemic    PONV: none          No notable events documented.     Nurse Pain Score: 1 (NPRS)

## 2023-05-27 NOTE — PROGRESS NOTES
AA&O x1; confused; oriented only to self. Baseline alzheimers.  On room air.  PtRN pain meds available per MAR.  Skin per flowsheets.   Scalp laceration LEE and L eye injury.  + void (incontinent - condom cath on), - BM. LBM PTA.  Denies N/V; NPO.  Pt needs x2 assist using a FWW.  SCDs on to BLE. VTE prophylaxis not ordered at this time due to trauma with hemorrhage.

## 2023-05-27 NOTE — PROGRESS NOTES
Assumed care of patient at 0645. Bedside report received. Assessment complete.  AA&Ox1 oriented to self only. Displays no signs or symptoms of CP/SOB.  PainAd Score of 0, No Intervention Indicated for Pain   Educated patient regarding pharmacologic and non pharmacologic modalities for pain management.  Skin per flowsheets  Tolerating L6 Soft and Bite Sized diet. Denies N/V.  + void. + BM. Last BM 5/27  Pt ambulates x2 assist  All needs met at this time. Call light within reach. Pt does not call  appropriately. Bed low and locked, non skid socks in place. Hourly rounding in place.

## 2023-05-27 NOTE — DISCHARGE INSTRUCTIONS
- Call or seek medical attention for questions or concerns  - Follow up with the [facility] RETURN: 6/6/2023  - Keep wounds clean and dry. Look for signs of infection that include erythema, purulent discharge, and warmth.  - Follow up with primary care provider within one weeks time to inform them of your hospital admission  - Resume regular diet  - May take over the counter acetaminophen or ibuprofen as needed for pain  - Continue daily over the counter stool softener while on narcotics  - No operation of machinery or motorized vehicles while under the influence of narcotics  - No alcohol, marijuana or illicit drug use while under the influence of narcotics  - In the event of a narcotic overdose naloxone (Narcan) is available without a prescription from any [facility] or [facility]  - No swimming, hot tubs, baths or wound submersion until cleared by outpatient provider. May shower  - No contact sports, strenuous activities, or heavy lifting until cleared by outpatient provider  - If respiratory decompensation, persistent or worsening pain, or signs or symptoms of infection occur seek medical attention

## 2023-05-27 NOTE — PROGRESS NOTES
4 Eyes Skin Assessment Completed by RADHA Jane and Lu RN.     Head Incision; laceration with sutures LEE with scant sero sang drainage  L eye hemorrhage injury  Ears Blanching  Nose Redness; blanching  Mouth WDL  Neck WDL  Breast/Chest WDL  Shoulder Blades Redness; abrasion - covered with adaptic and mepilex  Spine Redness and Blanching  (R) Arm/Elbow/Hand Redness, Bruising, and Scab  (L) Arm/Elbow/Hand Redness, Bruising, and Scab  Abdomen WDL  Groin Redness, Condom Catheter in place, Application Site Clean, Dry, Intact   Scrotum/Coccyx/Buttocks Redness and Blanching  (R) Leg Redness and Blanching to R knee  (L) Leg Redness and Scab L knee  (R) Heel/Foot/Toe Redness to great toe  (L) Heel/Foot/Toe WDL    Devices In Places Pulse Ox and SCD's and Condom Catheter        Interventions In Place Heel Mepilex, TAP System, Pillows, Elbow Mepilex, Q2 Turns, Low Air Loss Mattress, Barrier Cream, and Heels Loaded W/Pillows Barrier paste, Ambulation Encouraged      Possible Skin Injury Yes     Pictures Uploaded Into Epic Yes  Wound Consult Placed Yes  RN Wound Prevention Protocol Ordered Yes

## 2023-05-27 NOTE — DISCHARGE PLANNING
Received Choice form at 7069  Agency/Facility Name: [facility]   Referral sent per Choice form @ 9501

## 2023-05-27 NOTE — PROGRESS NOTES
Trauma / Surgical Daily Progress Note    Date of Service  5/27/2023    Chief Complaint  83 y.o. adult admitted 5/26/2023 with scalp avulsion [place]    POD #1 irrigation, debridement, and repair of complex scalp laceration    Interval Events  Tertiary survey complete.  Medication reconciliation complete.   Limited tertiary exam - no new injuries found.  Patient following and answers questions.  Swallow evaluation completed.    -Wound care to scalp laceration.  -Pulmonary hygiene.  -Mobilize out of bed and into chair.  -No family at bedside to discuss plan of care.    Case management and myself, have been attempting to get a hold of wife to find out if the patient lives in a facility or at home.  Patient is medically cleared for discharge.    Review of Systems  Review of Systems   Constitutional:  Negative for chills and fever.   Eyes:  Negative for blurred vision, double vision and pain.   Gastrointestinal:  Negative for abdominal pain, nausea and vomiting.   Musculoskeletal:  Negative for myalgias.   Psychiatric/Behavioral:  Positive for memory loss.    All other systems reviewed and are negative.       Vital Signs  Temp:  [36.7 °C (98.1 °F)-37.4 °C (99.3 °F)] 37.4 °C (99.3 °F)  Pulse:  [] 81  Resp:  [11-18] 18  BP: (106-183)/(56-97) 106/56  SpO2:  [93 %-97 %] 97 %    Physical Exam  Physical Exam  Vitals and nursing note reviewed.   Constitutional:       General: Nikki Gutierres is not in acute distress.     Appearance: Nikki Campbell-David is not ill-appearing or toxic-appearing.   HENT:      Head:      Comments: Scalp laceration well approximated with sutures.     Right Ear: External ear normal.      Left Ear: External ear normal.      Nose:      Comments: Abrasion to the bridge of nose     Mouth/Throat:      Mouth: Mucous membranes are dry.      Pharynx: Oropharynx is clear.   Eyes:      Conjunctiva/sclera:      Left eye: Left conjunctiva is injected.       Pupils: Pupils are equal, round, and reactive to light.   Cardiovascular:      Rate and Rhythm: Normal rate.      Pulses: Normal pulses.      Heart sounds: Normal heart sounds.   Pulmonary:      Effort: Pulmonary effort is normal. No respiratory distress.      Breath sounds: Decreased breath sounds present.   Abdominal:      General: Bowel sounds are normal. There is no distension.      Palpations: Abdomen is soft.      Tenderness: There is no abdominal tenderness. There is no guarding.   Genitourinary:     Comments: Condom cath  Musculoskeletal:         General: No tenderness.      Cervical back: Normal range of motion. No tenderness.      Comments: Denies tenderness to spine with palpation.  Full range of motion x 4.   Skin:     General: Skin is warm and dry.      Capillary Refill: Capillary refill takes less than 2 seconds.      Findings: Bruising present.      Comments: Multiple abrasions throughout  Scalp laceration well approximated with sutures   Neurological:      Mental Status: Nigiri Seventy-Three is alert. Mental status is at baseline.      Comments: Severe Alzheimer's   Psychiatric:         Mood and Affect: Mood normal.         Behavior: Behavior normal.         Laboratory  Recent Results (from the past 24 hour(s))   CBC with Differential: Tomorrow AM    Collection Time: 05/27/23  2:34 AM   Result Value Ref Range    WBC 12.3 (H) 4.8 - 10.8 K/uL    RBC 4.38 (L) 4.70 - 6.10 M/uL    Hemoglobin 13.6 (L) 14.0 - 18.0 g/dL    Hematocrit 39.3 (L) 42.0 - 52.0 %    MCV 89.7 81.4 - 97.8 fL    MCH 31.1 27.0 - 33.0 pg    MCHC 34.6 32.3 - 36.5 g/dL    RDW 43.5 35.9 - 50.0 fL    Platelet Count 223 164 - 446 K/uL    MPV 9.7 9.0 - 12.9 fL    Neutrophils-Polys 79.30 (H) 44.00 - 72.00 %    Lymphocytes 6.40 (L) 22.00 - 41.00 %    Monocytes 13.70 (H) 0.00 - 13.40 %    Eosinophils 0.00 0.00 - 6.90 %    Basophils 0.10 0.00 - 1.80 %    Immature Granulocytes 0.50 0.00 - 0.90 %    Nucleated RBC 0.00 0.00 - 0.20 /100 WBC     Neutrophils (Absolute) 9.73 (H) 1.82 - 7.42 K/uL    Lymphs (Absolute) 0.78 (L) 1.00 - 4.80 K/uL    Monos (Absolute) 1.68 (H) 0.00 - 0.85 K/uL    Eos (Absolute) 0.00 0.00 - 0.51 K/uL    Baso (Absolute) 0.01 0.00 - 0.12 K/uL    Immature Granulocytes (abs) 0.06 0.00 - 0.11 K/uL    NRBC (Absolute) 0.00 K/uL   Basic Metabolic Panel (BMP): Tomorrow AM    Collection Time: 05/27/23  2:34 AM   Result Value Ref Range    Sodium 141 135 - 145 mmol/L    Potassium 3.8 3.6 - 5.5 mmol/L    Chloride 103 96 - 112 mmol/L    Co2 15 (L) 20 - 33 mmol/L    Glucose 78 65 - 99 mg/dL    Bun 30 (H) 8 - 22 mg/dL    Creatinine 1.34 0.50 - 1.40 mg/dL    Calcium 8.8 8.5 - 10.5 mg/dL    Anion Gap 23.0 (H) 7.0 - 16.0   ESTIMATED GFR    Collection Time: 05/27/23  2:34 AM   Result Value Ref Range    GFR (CKD-EPI) 53 (A) >60 mL/min/1.73 m 2       Fluids    Intake/Output Summary (Last 24 hours) at 5/27/2023 1400  Last data filed at 5/27/2023 1000  Gross per 24 hour   Intake 340 ml   Output 300 ml   Net 40 ml       Core Measures & Quality Metrics  Labs reviewed, Medications reviewed and Radiology images reviewed  Peter catheter: No Peter      DVT Prophylaxis: Enoxaparin (Lovenox)  DVT prophylaxis - mechanical: SCDs  Ulcer prophylaxis: Not indicated    Assessed for rehab: Patient was assess for and/or received rehabilitation services during this hospitalization    RAP Score Total: 4    CAGE Results: not completed Blood Alcohol>0.08: no       Assessment/Plan  * Trauma- (present on admission)  Assessment & Plan  [place].  Trauma Red Activation.  Neeraj Cortez MD. Trauma Surgery.    Scalp avulsion, initial encounter- (present on admission)  Assessment & Plan  Arrived with avulsion to forehead.  CT with multifocal soft tissue lacerations and marked subcutaneous swelling in the left frontal region. Multiple small radiopaque foreign bodies in the swollen soft tissues.  Rocephin en route.  Zosyn in the trauma  bay.  OR for irrigation, debridement, and repair of complex laceration measuring total of 18 cm.  Bacitracin and wound care.    No contraindication to deep vein thrombosis (DVT) prophylaxis- (present on admission)  Assessment & Plan  Prophylactic dose enoxaparin 30 mg BID initiated upon admission.    Alzheimer's dementia (HCC)- (present on admission)  Assessment & Plan  History of.  Chronic condition treated with donepezil and memantine.  5/27 Medications restarted.      Mental status adequate for full examination?: No    Spine cleared (radiologically and/or clinically): Yes    All current laboratory studies/radiology exams reviewed: Yes    Medications reconciliation has been reviewed: No, awaiting for wife to get back to me    Completed Consultations:  None.     Pending Consultations:  None.    Newly Identified Diagnoses and Injuries:  Unable to determine at this time patient has severe alzheimer's.        Discussed patient condition with RN, Patient, and trauma surgery, Dr. Cortez.

## 2023-05-27 NOTE — DISCHARGE SUMMARY
Trauma Discharge Summary    DATE OF ADMISSION: 5/26/2023    DATE OF DISCHARGE: 5/27/2023    LENGTH OF STAY: 1 day    ATTENDING PHYSICIAN: Neeraj Cortez M.D.    CONSULTING PHYSICIAN:   1. None.    DISCHARGE DIAGNOSIS:  Principal Problem:    Trauma (POA: Yes)  Active Problems:    Scalp avulsion, initial encounter (POA: Yes)    Alzheimer's dementia (HCC) (POA: Yes)    Pacemaker (POA: Unknown)    No contraindication to deep vein thrombosis (DVT) prophylaxis (POA: Yes)  Resolved Problems:    Hypothermia associated with environmental change (POA: Yes)      PROCEDURES:  1.  Date of procedure 5/26/2023, performed by Dr. Neeraj Cortez MD, Trauma surgery  -Irrigation, debridement and repair of complex laceration measuring total of 18 cm.    HISTORY OF PRESENT ILLNESS: The patient is a 83 y.o. male who was reportedly lost for period of time.  He has severe Alzheimer's and eventually was found.  There was no witnessed trauma but he was found to have severe scalp lacerations with significant contamination.  He received IV fluids and Rocephin in route to the hospital. He was was transferred to [facility] in [place], [place].    HOSPITAL COURSE: The patient was triaged as a full trauma activation.  He was nonverbal with GCS of 10 and hypothermic, 35.8 degree celsius in the trauma bay, otherwise vital signs were stable. Patient received Zosyn in the trauma bay and warm IV fluids. The patient was transported to the operating suite for an irrigation, debridement and repair of complex laceration to the scalp.  He was then transferred to general surgical peace.    All CT imaging was negative for acute fractures.     Physical therapy worked with the patient while in the hospital and they are recommending home health and a front wheel walker. Referral and DME were placed.    Discussion was held at bedside with the wife regarding a safe discharge for the patient. The wife has reassured us that she has managed to care  for him through his severe Alzheimer's and would prefer to take him home.  We did discuss that home health was recommended and she did agree with the recommendation.    On the day of discharge, the patient is alert to himself and at baseline.  He is afebrile and nontoxic in appearance.  He was tolerating a diet and had adequate pain control.  Follow-up in the trauma clinic was discussed with the wife for wound recheck and suture removal.    HOSPITAL PROBLEM LIST:  * Trauma- (present on admission)  Assessment & Plan  [place]  Trauma Red Activation.  Neeraj Cortez MD. Trauma Surgery.    Scalp avulsion, initial encounter- (present on admission)  Assessment & Plan  Arrived with avulsion to forehead.  CT with multifocal soft tissue lacerations and marked subcutaneous swelling in the left frontal region. Multiple small radiopaque foreign bodies in the swollen soft tissues.  Rocephin en route.  Zosyn in the trauma bay.  OR for irrigation, debridement, and repair of complex laceration measuring total of 18 cm.  Bacitracin and wound care.    No contraindication to deep vein thrombosis (DVT) prophylaxis- (present on admission)  Assessment & Plan  Prophylactic dose enoxaparin 30 mg BID initiated upon admission.    Alzheimer's dementia (HCC)- (present on admission)  Assessment & Plan  History of.  Chronic condition treated with donepezil and memantine.  5/27 Medications restarted.    Hypothermia associated with environmental change-resolved as of 5/27/2023, (present on admission)  Assessment & Plan  [place]. 35.8 Celsius temperature.  Warming measures applied.  5/27 Normotemp  Monitor.          DISPOSITION: Discharged home in stable condition on 5/27/2023. The patient and family were counseled and questions were answered. Specifically, signs and symptoms of infection, respiratory decompensation and persistent or worsening pain were discussed and the patient agrees  to seek medical attention if any of these develop.    DISCHARGE MEDICATIONS:  The patients controlled substance history was reviewed and a controlled substance use informed consent (if applicable) was provided by [facility] and the patient has been prescribed.     Medication List        START taking these medications        Instructions   acetaminophen 325 MG Tabs  Commonly known as: Tylenol   Take 2 Tablets by mouth every 6 hours. Take as directed on the bottle, as needed for pain  Dose: 650 mg            CONTINUE taking these medications        Instructions   donepezil 10 MG tablet  Commonly known as: ARICEPT   Take 10 mg by mouth 2 times a day.  Dose: 10 mg     MELATONIN PO   Take 1 Capsule by mouth at bedtime.  Dose: 1 Capsule     Memantine HCl ER 28 MG Cp24  Commonly known as: NAMENDA   Take 28 mg by mouth every day.  Dose: 28 mg              ACTIVITY:  Activity as tolerated.    WOUND CARE:  Keep wounds clean and dry.  Look for signs of infection that include erythema, purulent discharge and warmth.    DIET:  Orders Placed This Encounter   Procedures    Diet Order Diet: Level 6 - Soft and Bite Sized; Liquid level: Level 0 - Thin; Tray Modifications (optional): SLP - 1:1 Supervision by Nursing     Standing Status:   Standing     Number of Occurrences:   1     Order Specific Question:   Diet:     Answer:   Level 6 - Soft and Bite Sized [23]     Order Specific Question:   Liquid level     Answer:   Level 0 - Thin     Order Specific Question:   Tray Modifications (optional)     Answer:   SLP - 1:1 Supervision by Nursing       FOLLOW UP:  [facility]  [address] [place] [address] [id number]  [place] [id number]  [phone]    Follow up on 6/6/2023  Please call and make an appt in the Trauma clinic for Tuesday 6/6/2023 to have sutures removed and for wound re-check      TIME SPENT ON DISCHARGE: 35 minutes      ____________________________________________  Radha Roberson D.N.P.    DD: 5/27/2023 3:28  PM

## 2023-05-27 NOTE — PROGRESS NOTES
4 Eyes Skin Assessment Completed by RADHA Cunningham and RADHA Alba.    Head Incision; laceration with sutures LEE with scant sero sang drainage  L eye hemorrhage injury  Ears Blanching  Nose Redness; blanching  Mouth WDL  Neck WDL  Breast/Chest WDL  Shoulder Blades Redness; abrasion - covered with adaptic and mepilex  Spine Redness and Blanching  (R) Arm/Elbow/Hand Redness, Bruising, and Scab  (L) Arm/Elbow/Hand Redness, Bruising, and Scab  Abdomen WDL  Groin Redness  Scrotum/Coccyx/Buttocks Redness and Blanching  (R) Leg Redness and Blanching to R knee  (L) Leg Redness and Scab L knee  (R) Heel/Foot/Toe Redness to great toe  (L) Heel/Foot/Toe WDL      Face      L hip      R great toe      L shoulder        L elbow        R elbow        L knee        Sacrum        Devices In Places Pulse Ox and SCD's and condom cath      Interventions In Place Heel Mepilex, TAP System, Pillows, Elbow Mepilex, Q2 Turns, Low Air Loss Mattress, Barrier Cream, and Heels Loaded W/Pillows Barrier paste.    Possible Skin Injury Yes    Pictures Uploaded Into Epic Yes  Wound Consult Placed Yes  RN Wound Prevention Protocol Ordered Yes

## 2023-05-27 NOTE — PROGRESS NOTES
Med Rec complete per patient's spouse  No oral antibiotics in the last 30 days   [email or web address]

## 2023-05-27 NOTE — CARE PLAN
Problem: Knowledge Deficit - Standard  Goal: Patient and family/care givers will demonstrate understanding of plan of care, disease process/condition, diagnostic tests and medications  Outcome: Progressing     Problem: Pain - Standard  Goal: Alleviation of pain or a reduction in pain to the patient’s comfort goal  Outcome: Progressing     Problem: Skin Integrity  Goal: Skin integrity is maintained or improved  Outcome: Progressing   The patient is Stable - Low risk of patient condition declining or worsening    Shift Goals  Clinical Goals: Discharge/ Identification  Patient Goals: Rest  Family Goals: Discharge    Progress made toward(s) clinical / shift goals: Educated patient and Wife on plan of care this shift, Wife verbalized understanding of plan of care this shift, q2 turns in place, patient placed on MARLENI mattress     Patient is not progressing towards the following goals:

## 2023-05-27 NOTE — DISCHARGE PLANNING
Attempted to reach spouse @ [phone], no answer and no option to leave VM    Requested RN notify SW if spouse arrives at bedside today     1100- Second call to spouse and was able to leave VM    1143- VM left on home phone [phone]    1500- Met with pt's wife at bedside. She reports that pt lives with her in a single level home in [place]. She is a retired physical therapist and has been able to manage pt at home on her own. She states all their family [place] in the [place] and their support system here is made up of close friends. She states that at baseline pt is very active and was not using any DME prior to admission. Pt has dementia but spouse reports pt isn't aggressive and up until current event pt never wandered. Discussed d/c options and spouse states she feels comfortable taking pt home. Spouse is agreeable with  services for PT/OT. Spouse states pt does have Medicare coverage and PCP is Dr. Luther in [place]. They have a walker at home already that pt can use.     Message sent to [facility] requesting they confirm Medicare coverage    Choice form completed for HH and faxed to [facility]    1600- Spoke with Nadya @ [facility]  and they're able to accept pending insurance. If any issues they will send referral to another agency.   Attempted to meet with spouse to provide contact information to HH. Spouse and pt already left. APN notified we're unable to verify HH.

## 2023-05-28 NOTE — THERAPY
Physical Therapy   Daily Treatment     Patient Name: Carlos Murry  Age:  83 y.o., Sex:  male  Medical Record #: [id number]  Today's Date: 5/27/2023     Precautions  Precautions: Fall Risk  Comments: hx of dementia    Assessment    Pt seen with wife at bedside; she reports some history of working 'in Physical therapy' and is familiar with assisting patients; she was able to demonstrate the ability to provide contact guard assist; pt mobilizing better with her than this AM given familiarity of cues; able to perform entry stairs and discussed home health role; anticipate pt can return home with wife's 24/7 support when medically appropriate to do so; will follow if remains in house.     Plan    Treatment Plan Status: Continue Current Treatment Plan  Type of Treatment: Bed Mobility, Equipment, Manual Therapy, Gait Training, Neuro Re-Education / Balance, Self Care / Home Evaluation, Stair Training, Therapeutic Activities, Therapeutic Exercise  Treatment Frequency: 3 Times per Week  Treatment Duration: Until Therapy Goals Met    DC Equipment Recommendations: 4-Wheeled Walker (not necessary to dc, requested by wife)  Discharge Recommendations: Recommend home health for continued physical therapy services      Abridged Subjective/Objective     05/27/23 1555   Cognition    Cognition / Consciousness X   Level of Consciousness Alert   Comments pleasant and cooperative; wife providing cues for intation and completion   Balance   Sitting Balance (Static) Fair +   Sitting Balance (Dynamic) Fair +   Standing Balance (Static) Fair -   Standing Balance (Dynamic) Fair -   Weight Shift Sitting Good   Weight Shift Standing Fair   Skilled Intervention Sequencing;Postural Facilitation;Tactile Cuing;Verbal Cuing   Comments B UE support in sitting/standing; no loss of balance in moving enviornment; prefers hand hold assist rather than use of FWW   Bed Mobility    Supine to Sit Minimal Assist  (provided by wife)   Sit to Supine Minimal  Assist   Gait Analysis   Gait Level Of Assist Contact Guard Assist   Assistive Device Hand Held Assist   Distance (Feet) 100   # of Times Distance was Traveled 1   Deviation Shuffled Gait;Bradykinetic   # of Stairs Climbed 2   Level of Assist with Stairs Standby Assist   Weight Bearing Status full   Skilled Intervention Postural Facilitation;Sequencing;Tactile Cuing;Verbal Cuing   Comments distance limited by focus of session for family training   Functional Mobility   Sit to Stand Contact Guard Assist  (with HHA)   Short Term Goals    Short Term Goal # 1 Pt will perform supine<>sit from flat HOB/no railing wiht supervision within 6 visits to progress to independence (as needed).   Goal Outcome # 1 goal not met   Short Term Goal # 2 Pt will perform sit<>stand iwth FWW and supervision within 6 visits to progress to independence (as needed).   Goal Outcome # 2 Goal not met   Short Term Goal # 3 Pt will ambulate x 150ft with FWW and supervision within 6 visits to ensure independent mobility at home (as needed).   Goal Outcome # 3 Goal not met

## 2023-05-31 NOTE — DOCUMENTATION QUERY
"                                                                         [facility]                                                                       Query Response Note      PATIENT:               BRYANT RODRIGUEZ  ACCT #:                  [id number]  MRN:                     [id number]  :                      1940  ADMIT DATE:       2023 9:49 AM  DISCH DATE:        2023 4:18 PM  RESPONDING  PROVIDER #:        [id number]           QUERY TEXT:    Debridement is documented in the Medical Record. Please specify the type.      NOTE:  If an appropriate response is not listed below, please respond with a new note.      Note: If you agree with a diagnosis listed above, please remember to include it in your concurrent daily documentation and onto the Discharge Summary.          The patient's Clinical Indicators include:  83 year old male admitted for a complex scalp laceration.     Op Report Diego \"Procedure:  Irrigation, debridement, and repair of complex laceration measuring total of 18 cm\"  \" The rough edges of the laceration were then sharply debrided.  Hemostasis was assured with cautery.  The lacerations were then approximated with running 2-0 Ethilon sutures.\"    Risk factors: dementia, trauma with extensive contaminated wounds  Treatment: labs, irrigation, debridement and repair of head wound    If you have any questions, please contact:  Juliet Chand RN CDI [facility]  [email or web address]  Juliet Chand [address]  Options provided:   -- Non-excisional debridement   -- Excisional debridement, Please specify the following details- Deepest level of debridement treated, instrument used, nature of tissue, appearance/size of wound, and technique   -- Other explanation, please specify      Query created by: Juliet Chand on 2023 7:27 AM    RESPONSE TEXT:    excisional debridement- to bone,contaminated wound          Electronically signed by:  BARRINGTON MAR MD 2023 " 9:49 AM

## (undated) DEVICE — TUBING CLEARLINK DUO-VENT - C-FLO (48EA/CA)

## (undated) DEVICE — GLOVE BIOGEL SZ 8 SURGICAL PF LTX - (50PR/BX 4BX/CA)

## (undated) DEVICE — DRAPE LAPAROTOMY T SHEET - (12EA/CA)

## (undated) DEVICE — SUCTION INSTRUMENT YANKAUER BULBOUS TIP W/O VENT (50EA/CA)

## (undated) DEVICE — SUTURE GENERAL

## (undated) DEVICE — SENSOR OXIMETER ADULT SPO2 RD SET (20EA/BX)

## (undated) DEVICE — ELECTRODE DUAL RETURN W/ CORD - (50/PK)

## (undated) DEVICE — PACK MINOR BASIN - (2EA/CA)

## (undated) DEVICE — SPONGE GAUZESTER 4 X 4 4PLY - (128PK/CA)

## (undated) DEVICE — GOWN WARMING STANDARD FLEX - (30/CA)

## (undated) DEVICE — SODIUM CHL IRRIGATION 0.9% 1000ML (12EA/CA)

## (undated) DEVICE — SLEEVE, VASO, THIGH, MED

## (undated) DEVICE — STAPLER SKIN DISP - (6/BX 10BX/CA) VISISTAT

## (undated) DEVICE — CHLORAPREP 26 ML APPLICATOR - ORANGE TINT(25/CA)

## (undated) DEVICE — SET EXTENSION WITH 2 PORTS (48EA/CA) ***PART #2C8610 IS A SUBSTITUTE*****

## (undated) DEVICE — CANISTER SUCTION 3000ML MECHANICAL FILTER AUTO SHUTOFF MEDI-VAC NONSTERILE LF DISP  (40EA/CA)

## (undated) DEVICE — SET LEADWIRE 5 LEAD BEDSIDE DISPOSABLE ECG (1SET OF 5/EA)

## (undated) DEVICE — COVER LIGHT HANDLE ALC PLUS DISP (18EA/BX)

## (undated) DEVICE — LACTATED RINGERS INJ 1000 ML - (14EA/CA 60CA/PF)